# Patient Record
Sex: MALE | Race: WHITE
[De-identification: names, ages, dates, MRNs, and addresses within clinical notes are randomized per-mention and may not be internally consistent; named-entity substitution may affect disease eponyms.]

---

## 2019-11-21 ENCOUNTER — HOSPITAL ENCOUNTER (OUTPATIENT)
Dept: HOSPITAL 96 - M.SLEEPLAB | Age: 74
End: 2019-11-21
Attending: FAMILY MEDICINE
Payer: MEDICARE

## 2019-11-21 DIAGNOSIS — G47.33: ICD-10-CM

## 2019-11-21 DIAGNOSIS — G47.30: Primary | ICD-10-CM

## 2019-11-24 NOTE — SLEEP
[address]  [address]                    SLEEP STUDY REPORT            
_______________________________________________________________________________
 
Name:       SANDRALEDY E             Room:                      REG CLI 
M.R.#:  [id number]      Account #:      [id number]  
Admission:  11/21/19     Attend Phys:    Preston Vieyra DO  
Discharge:               Date of Birth:  [birth year]  
         Report #: [id number]
                                                                     [id number]  
_______________________________________________________________________________
THIS REPORT FOR:  //name//                      
 
CC: Preston Vieyra DO
 
This study has been reviewed in its entirety by a board certified sleep
specialist
 
DATE OF SERVICE:  11/21/2019
 
 
SLEEP STUDY
 
REFERRING PHYSICIAN:  Preston Vieyra DO
 
The patient is a 74-year-old who weighs 218 pounds with a BMI of 32.  The
patient's [place] score was 11.  The patient underwent a split night study
performed at [facility].
 
During the night study, the patient spent 420 minutes in bed and slept for 294
minutes with a sleep efficiency of 70%.  Sleep latency was 13.5 minutes with a
REM latency of 29.5 minutes, which was short.  Sleep architecture showed normal
stage 1 and stage 2 sleep, normal slow wave and normal REM sleep.
 
During the initial diagnostic portion of the study, the patient slept for 118
minutes.  During that time, there were no obstructive mixed or central apneas. 
There were 23 hypopneas.  The patient's apnea hypopnea index was 11.6 per hour
with a non-REM index of 16.9 per hour and a REM index of 3.8 per hour.  The
patient's supine AHI was 40 per hour.
 
EKG monitoring revealed an average heart rate of 57 beats per minute.  No
sustained arrhythmias observed.
 
PLMS were seen at an index of 28 per hour and none caused EEG arousals.
 
Nocturnal oximetry study revealed an average oxygen saturation of 94% with the
lowest of 80%.  Three minutes were spent in oxygen saturation of less than 89%.
 
The patient met the criteria for CPAP initiation.  It was started at 5 cm water,
but the patient felt claustrophobic and could not tolerate CPAP, as a result, he
was switched to BiPAP at 7/4 and titrated up to 9/5.  At the final pressure, the
patient slept for 33 minutes including 6 minutes of supine REM sleep.  The
patient's AHI was reduced to 0 per hour and oxygen saturation remained above
91%.
 
IMPRESSION:
 
 
 
[place] [address]                    SLEEP STUDY REPORT            
_______________________________________________________________________________
 
Name:       LEDY FLORES             Room:                      REG CLI 
M.R.#:  [id number]      Account #:      [id number]  
Admission:  11/21/19     Attend Phys:    Preston Vieyra DO  
Discharge:               Date of Birth:  [birth year]  
         Report #: [id number]
                                                                     [id number]  
_______________________________________________________________________________
1.  Mild sleep apnea-hypopnea syndrome with worsening during supine sleep. 
Total AHI 11.6 per hour with a supine AHI of 40.6 per hour.
2.  No clinically significant nocturnal hypoxia.
3.  Moderate PLMS without any EEG arousals.  This does not need to be treated.
 
RECOMMENDATIONS:
1.  BiPAP at a pressure of 9/5 completely eliminated the patient's sleep apnea
and should be used on a nightly basis.
2.  Follow up in 4-6 weeks to assess compliance with BiPAP and to document
clinical improvement.
3.  Weight loss is advised.
4.  Avoid CNS depressants.
5.  Cautioned regarding driving until symptoms of sleep apnea resolve with the
use of BiPAP.
6.  Avoid supine sleep.
 
 
 
 
 
 
 
 
 
 
 
 
 
 
 
 
 
 
 
 
 
 
 
 
 
 
 
 
 
<ELECTRONICALLY SIGNED>
                                        By:  Carlos Soloiro MD              
11/24/19     1801
D: 11/22/19 1308_______________________________________
T: 11/22/19 1405Aman NATE Solorio MD                 /nt

## 2020-07-01 ENCOUNTER — HOSPITAL ENCOUNTER (OUTPATIENT)
Dept: HOSPITAL 96 - M.CRD | Age: 75
End: 2020-07-01
Attending: FAMILY MEDICINE
Payer: MEDICARE

## 2020-07-01 DIAGNOSIS — R07.89: Primary | ICD-10-CM

## 2020-07-03 NOTE — TST
[facility]
[address] [place] [address]
Phone:  [phone]                     TREADMILL STRESS TEST         
_______________________________________________________________________________
 
Name:         LEDY FLORES            Room:                     REG CLI
M.R.#:    [id number]     Account #:     [id number]  
Admission:    07/01/20    Attend Phys:   MAIA Lopez
Discharge:                Date of Birth: [birth year]  
Date of Service: 07/01/20 1655  Report #:      [id number]
        [id number]     
_______________________________________________________________________________
THIS REPORT FOR:
 
cc:  Preston Vieyra Steve T. DO List[place], Michael J. MD [facility]     
                                                                       ~
CC: Jah Herrera MD FAC
    Preston Vieyra DO
 
PROCEDURE:  Standard Jose protocol exercise stress test.
 
INDICATION:  Chest pain.
 
CARDIAC HISTORY:  None.
 
CARDIAC RISK FACTORS:  Age greater than 45, hyperlipidemia, hypertension, type 2
diabetes mellitus and family history of coronary artery disease.
 
CARDIAC MEDICATIONS:  Aspirin and losartan.
 
The patient exercised per standard Jose protocol for a total of 6 minutes and
24 seconds.  The resting blood pressure was 112/67 mmHg with a resting heart
rate of 58 beats per minute.  At peak stress, the blood pressure was 206/72 mmHg
with a peak stress heart rate of 113 beats per minute.  The recovery, blood
pressure was 150/70 mmHg with a recovery heart rate of 67 beats per minute.
 
The patient achieved 77% of the age predicted maximum heart rate and an energy
expenditure equivalent to 6.24 METS.  The patient exhibited limited exercise
capacity and failed to achieve target heart rate.  The patient had leg pain and
cramps and was unable to continue exercise.
 
The baseline 12-lead EKG shows sinus rhythm without significant ST segment or
T-wave abnormality.  EKGs obtained during a post-exercise shows sinus rhythm and
sinus tachycardia with 0.5 mm ST-segment depression noted in the inferior and
inferolateral leads that persisted into recovery.  There was slight T-wave
inversion in the leads as well.
 
The patient had no clinical symptoms to suggest angina.
 
IMPRESSION:
1.  Nondiagnostic standard Jose protocol exercise stress test as the patient
failed to meet target heart rate.
2.  EKG findings suggestive of but not diagnostic for inducible ischemia.
 
CONCLUSION:  Nondiagnostic standard Jose protocol exercise stress test. 
 
 
[place] [address]
Phone:  [phone]                     TREADMILL STRESS TEST         
_______________________________________________________________________________
 
Name:         LEDY FLORES            Room:                     REG CLI
M.R.#:    [id number]     Account #:     [id number]  
Admission:    07/01/20    Attend Phys:   MAIA Lopez
Discharge:                Date of Birth: [birth year]  
Date of Service: 07/01/20 1655  Report #:      [id number]
        [id number]     
_______________________________________________________________________________
Consider repeat stress testing with alternate stress and imaging modalities if
clinically indicated.
 
 
 
 
 
 
 
 
 
 
 
 
 
 
 
 
 
 
 
 
 
 
 
 
 
 
 
 
 
 
 
 
 
 
 
 
 
 
 
 
 
 
  <ELECTRONICALLY SIGNED>
                                           By: Richard Argueta MD, [facility]   
  07/03/20     1205
D: 07/01/20 1655   _____________________________________
T: 07/01/20 2114   Richard Argueta MD, FACC     /nt